# Patient Record
Sex: FEMALE | Race: WHITE | NOT HISPANIC OR LATINO | Employment: OTHER | URBAN - METROPOLITAN AREA
[De-identification: names, ages, dates, MRNs, and addresses within clinical notes are randomized per-mention and may not be internally consistent; named-entity substitution may affect disease eponyms.]

---

## 2017-11-02 DIAGNOSIS — Z36.89 ENCOUNTER FOR FETAL ANATOMIC SURVEY: Primary | ICD-10-CM

## 2017-11-22 PROBLEM — K83.8 BILIARY SLUDGE: Status: ACTIVE | Noted: 2017-11-22

## 2017-11-23 PROBLEM — K85.90 ACUTE PANCREATITIS: Status: ACTIVE | Noted: 2017-11-23

## 2017-12-12 ENCOUNTER — OFFICE VISIT (OUTPATIENT)
Dept: MATERNAL FETAL MEDICINE | Facility: CLINIC | Age: 37
End: 2017-12-12
Payer: OTHER GOVERNMENT

## 2017-12-12 VITALS
SYSTOLIC BLOOD PRESSURE: 99 MMHG | BODY MASS INDEX: 30.51 KG/M2 | DIASTOLIC BLOOD PRESSURE: 61 MMHG | HEIGHT: 67 IN | WEIGHT: 194.38 LBS

## 2017-12-12 DIAGNOSIS — K85.12 ACUTE BILIARY PANCREATITIS WITH INFECTED NECROSIS: ICD-10-CM

## 2017-12-12 DIAGNOSIS — Z36.89 ENCOUNTER FOR FETAL ANATOMIC SURVEY: ICD-10-CM

## 2017-12-12 DIAGNOSIS — O09.512 AMA (ADVANCED MATERNAL AGE) PRIMIGRAVIDA 35+, SECOND TRIMESTER: ICD-10-CM

## 2017-12-12 DIAGNOSIS — Z15.09 LYNCH SYNDROME: ICD-10-CM

## 2017-12-12 DIAGNOSIS — O99.212 OBESITY COMPLICATING PREGNANCY IN SECOND TRIMESTER: ICD-10-CM

## 2017-12-12 PROCEDURE — 76811 OB US DETAILED SNGL FETUS: CPT | Mod: S$GLB,,, | Performed by: OBSTETRICS & GYNECOLOGY

## 2017-12-12 PROCEDURE — 99204 OFFICE O/P NEW MOD 45 MIN: CPT | Mod: 25,S$GLB,, | Performed by: OBSTETRICS & GYNECOLOGY

## 2017-12-12 NOTE — PROGRESS NOTES
Chief complaint: AMA, Espino syndrome, Pancreatitis during this pregnancy    Provider requesting consultation: Dr. Banegas-JESUS    37 y.o. [id number] 19w5d EGA.    PMH:  Past Medical History:   Diagnosis Date    Abnormal Pap smear of cervix     AMA (advanced maternal age) multigravida 35+     Asthma     Espino syndrome     Obesity     Stomach ulcer        PObHx:  OB History    Para Term  AB Living   1 0 0 0 0 0   SAB TAB Ectopic Multiple Live Births   0 0 0 0 0      # Outcome Date GA Lbr Rashaun/2nd Weight Sex Delivery Anes PTL Lv   1 Current                   PSH:  Past Surgical History:   Procedure Laterality Date    CHOLECYSTECTOMY  2017    HAND SURGERY      PRK eye surgery      SKIN GRAFT      left arm    SKIN GRAFT      WISDOM TOOTH EXTRACTION         Family history:family history is not on file.    Social history: reports that she quit smoking about 7 years ago. Her smoking use included Cigarettes. She has a 20.00 pack-year smoking history. She does not have any smokeless tobacco history on file. She reports that she does not drink alcohol or use drugs.    A detailed fetal anatomical ultrasound was completed today.  See details in imaging section of EPIC.    1) AMA-Today I counseled the patient on the relationship between maternal age and aneuploidy. We discussed the risks and benefits of screening tests versus diagnostic genetic testing. She was counseled about her specific age related risk of Down Syndrome. We discussed the limitations of ultrasound in the definitive diagnosis of Down Syndrome and we discussed amniocentesis as providing definitive diagnosis. I quoted the patient a 1 in 500 procedure-related risk of fetal loss with genetic amniocentesis. I also discussed with the patient her cell-free DNA screening (ex. Materni T21) including the sensitivity and specificity of the test. Her questions were answered.  We also discussed the association of advanced maternal age with other  adverse pregnancy outcomes including IUGR, stillbirth and pre-eclampsia.    Recommendations from our MFM group at [facility]:  -For women who are of advanced maternal age at the time of delivery we recommend a follow up fetal ultrasound at 32-34 weeks for fetal growth assessment.    2) Espino syndrome- Espino syndrome is the most common cause of inherited colorectal cancer (CRC). It is characterized by a significantly increased risk for CRC and endometrial cancer as well as a risk of several other malignancies.    Espino syndrome is an autosomal dominant disorder that is caused by a germline mutation in one of several DNA mismatch repair genes or loss of expression of MSH2 due to deletion in the EPCAM gene (previously called TACSTD1). The mismatch repair (MMR) genes that are associated with Espino syndrome include:  MLH1 (MutL homolog 1), which is located on chromosome 3p22.2  MSH2 (MutS homolog 2), which is located on chromosome 3d15-84  MSH6 (MutS homolog 6), which is located on chromosome 2p16.3  PMS2 (postmeiotic segregation 2), which are located on chromosome 7p22.1   I am not in possession of the patient's medical record of her workup but she apparently has been tested for the mutations above.  Her father, sister and she all have the mutation, her brother has not yet been tested.    She is aware of the autosomal dominant transmission of the disease.      3) Pancreatitis- The patient had an acute onset of pancreatitis within 24 hours of her ERCP/colonoscopy.  This was found to be associated with chronic infectious cholangitis.  She was placed on antibiotics and underwent cholecystectomy.  She remains symptom free since then.  The father had some concerns about the pain meds used during this episode.  I reassured him that short duration opiod exposure did not put the pregnancy at risk and neither did the antibiotic (Zosyn) used.      The patient was given an opportunity to ask questions about management and the diease  process.  She expressed an understanding of and agreement to the above impression and plan. All questions were answered to her satisfaction.  She was given contact information to the [facility] clinic to address further concerns.      The approximate physician face-to-face time was 45 minutes. The majority of the time (>50%) was spent on counseling of the patient or coordination of care.

## 2018-01-08 ENCOUNTER — OFFICE VISIT (OUTPATIENT)
Dept: MATERNAL FETAL MEDICINE | Facility: CLINIC | Age: 38
End: 2018-01-08
Payer: OTHER GOVERNMENT

## 2018-01-08 VITALS — WEIGHT: 205 LBS | BODY MASS INDEX: 32.11 KG/M2 | SYSTOLIC BLOOD PRESSURE: 120 MMHG | DIASTOLIC BLOOD PRESSURE: 58 MMHG

## 2018-01-08 PROCEDURE — 76816 OB US FOLLOW-UP PER FETUS: CPT | Mod: S$GLB,,, | Performed by: OBSTETRICS & GYNECOLOGY

## 2018-01-08 PROCEDURE — 99499 UNLISTED E&M SERVICE: CPT | Mod: S$GLB,,, | Performed by: OBSTETRICS & GYNECOLOGY

## 2025-06-25 ENCOUNTER — HOSPITAL ENCOUNTER (OUTPATIENT)
Dept: RADIOLOGY | Facility: HOSPITAL | Age: 45
Discharge: HOME | End: 2025-06-25
Attending: SPECIALIST
Payer: OTHER GOVERNMENT

## 2025-06-25 DIAGNOSIS — K21.9 ESOPHAGEAL REFLUX: ICD-10-CM

## 2025-06-25 PROCEDURE — 76700 US EXAM ABDOM COMPLETE: CPT | Mod: TC,PO

## 2025-06-25 PROCEDURE — 76700 US EXAM ABDOM COMPLETE: CPT | Mod: 26,,, | Performed by: RADIOLOGY
